# Patient Record
Sex: FEMALE | Race: WHITE | HISPANIC OR LATINO
[De-identification: names, ages, dates, MRNs, and addresses within clinical notes are randomized per-mention and may not be internally consistent; named-entity substitution may affect disease eponyms.]

---

## 2018-10-12 PROBLEM — Z00.00 ENCOUNTER FOR PREVENTIVE HEALTH EXAMINATION: Status: ACTIVE | Noted: 2018-10-12

## 2018-10-15 ENCOUNTER — APPOINTMENT (OUTPATIENT)
Dept: NEUROSURGERY | Facility: CLINIC | Age: 43
End: 2018-10-15

## 2018-10-16 ENCOUNTER — APPOINTMENT (OUTPATIENT)
Dept: NEUROSURGERY | Facility: CLINIC | Age: 43
End: 2018-10-16
Payer: COMMERCIAL

## 2018-10-16 VITALS
HEART RATE: 70 BPM | DIASTOLIC BLOOD PRESSURE: 65 MMHG | SYSTOLIC BLOOD PRESSURE: 135 MMHG | HEIGHT: 67 IN | BODY MASS INDEX: 27.62 KG/M2 | WEIGHT: 176 LBS

## 2018-10-16 PROCEDURE — 99204 OFFICE O/P NEW MOD 45 MIN: CPT

## 2021-09-24 ENCOUNTER — APPOINTMENT (OUTPATIENT)
Age: 46
End: 2021-09-24
Payer: COMMERCIAL

## 2021-09-24 VITALS
HEIGHT: 67 IN | DIASTOLIC BLOOD PRESSURE: 68 MMHG | TEMPERATURE: 98.1 F | WEIGHT: 164 LBS | BODY MASS INDEX: 25.74 KG/M2 | HEART RATE: 58 BPM | SYSTOLIC BLOOD PRESSURE: 118 MMHG

## 2021-09-24 DIAGNOSIS — Z78.9 OTHER SPECIFIED HEALTH STATUS: ICD-10-CM

## 2021-09-24 DIAGNOSIS — Z83.3 FAMILY HISTORY OF DIABETES MELLITUS: ICD-10-CM

## 2021-09-24 DIAGNOSIS — Z80.41 FAMILY HISTORY OF MALIGNANT NEOPLASM OF OVARY: ICD-10-CM

## 2021-09-24 PROCEDURE — 99213 OFFICE O/P EST LOW 20 MIN: CPT

## 2021-09-24 NOTE — HISTORY OF PRESENT ILLNESS
[de-identified] : \par Amanda is here for a follow-up evaluation although it has been about 3 years since we have seen each other.  She has right-sided cervical radiculopathy and a new MRI done at [facility] for my review.  She has persistent right-sided symptomatology that seems to be refractory to nonsurgical management.  Her last epidural steroid injection was about a year ago.  She is done trigger point pretty recently without much relief.\par \par trigger point 2 weeks   KEVEN   1 year\par \par PM  Candace Nolasco\par PCP  [place]

## 2021-09-24 NOTE — PHYSICAL EXAM
[Motor Handedness Right-Handed] : the patient is right hand dominant [Sensation Tactile Decrease] : light touch was intact [Sensation Pain / Temperature Decrease] : pain and temperature was intact [Sensation Vibration Decrease] : vibration was intact [Proprioception] : proprioception was intact [Romberg's Sign] : Romberg's sign was negtive [FreeTextEntry6] : She has weakness in  trace weakness in biceps and triceps on the right. [Full ROM] : full ROM

## 2021-09-24 NOTE — RESULTS/DATA
[FreeTextEntry1] : [facility] MRI shows disc osteophyte complex at C5-6 on the left but more notable is a disc herniation on the right at C6-7.

## 2021-09-24 NOTE — REASON FOR VISIT
[New Patient Visit] : a new patient visit [Referred By: _________] : Patient was referred by EULOGIO [FreeTextEntry1] : Past patient (2018)- MRI [facility], Attended Pain management with no relief

## 2021-09-24 NOTE — PLAN
[FreeTextEntry1] : DIAGNOSIS:  CERVICAL SPONDYLOSIS\par TREATMENT PLAN:  NON-SURGICAL   VS.   ACDF  C6-7\par \par This is a patient with cervical spondylosis and stenosis.  I have recommended nonsurgical management at this time.  This consists of physical therapy and/or manual medicine in conjunction to medical therapy and other conservative methods.  These include the consideration of trigger point injections and or the utilization of modalities such as TENS where applicable.  The next tier would be the referral to a pain management specialist (anesthesia or physiatry) for the consideration of spinal injections.  This includes the options of epidural steroids, facet injections as well as other novel techniques that may provide pain relief.  Although there is indeed evidence of disk degeneration on imaging, discectomy or spinal fusion for the sole purposes of treating neck pain in the absence of a compressive lesion or neurological issue is associated with poor outcomes.   \par \par If all  nonsurgical methods fail, and/or the patient develops neurologic issues then, I have recommended cervical decompression and fusion as a treatment option.  This entails removing the disk, osteophytes and the ventral uncovertebral joints along with the underlying hypertrophied/ossified posterior longitudinal ligamentum.  This will allow for a widening of the spinal canal and the neuroforamen at the effected levels.  In doing so this will likely result in added instability to the spine at this level requiring the need for instrumented stabilization and fusion.  This implies the use of anterior plate fixation and interbody prosthetics to provide strength and anatomical alignment to the operated segments.  \par \par Risks and benefits of surgery were described to the patient in detail which include but not excluding those otherwise not mentioned:  coma paralysis, death, stroke, spinal fluid leak, nerve injury, weakness, infection, hardware malfunction/failure/mal-positioning requiring re-operation, vascular injury, nonunion/pseudoarthrosis, adjacent segment degeneration, dysphonia/dysphagia and persistent pain.\par \par I have reviewed the images in detail with the patient today in my office and have answered all questions regarding this condition to the best of my ability to the patient’s satisfaction.\par

## 2021-10-08 ENCOUNTER — TRANSCRIPTION ENCOUNTER (OUTPATIENT)
Age: 46
End: 2021-10-08

## 2021-11-05 ENCOUNTER — NON-APPOINTMENT (OUTPATIENT)
Age: 46
End: 2021-11-05

## 2021-11-12 ENCOUNTER — OUTPATIENT (OUTPATIENT)
Dept: OUTPATIENT SERVICES | Facility: HOSPITAL | Age: 46
LOS: 1 days | End: 2021-11-12
Payer: COMMERCIAL

## 2021-11-12 PROCEDURE — 93010 ELECTROCARDIOGRAM REPORT: CPT

## 2021-11-19 ENCOUNTER — APPOINTMENT (OUTPATIENT)
Dept: DISASTER EMERGENCY | Facility: CLINIC | Age: 46
End: 2021-11-19

## 2021-11-19 DIAGNOSIS — Z01.818 ENCOUNTER FOR OTHER PREPROCEDURAL EXAMINATION: ICD-10-CM

## 2021-11-20 LAB — SARS-COV-2 N GENE NPH QL NAA+PROBE: NOT DETECTED

## 2021-11-22 ENCOUNTER — OUTPATIENT (OUTPATIENT)
Dept: OUTPATIENT SERVICES | Facility: HOSPITAL | Age: 46
LOS: 1 days | End: 2021-11-22

## 2021-11-22 ENCOUNTER — APPOINTMENT (OUTPATIENT)
Dept: NEUROSURGERY | Facility: HOSPITAL | Age: 46
End: 2021-11-22
Payer: COMMERCIAL

## 2021-11-22 PROCEDURE — 22853 INSJ BIOMECHANICAL DEVICE: CPT | Mod: AS

## 2021-11-22 PROCEDURE — 22853 INSJ BIOMECHANICAL DEVICE: CPT

## 2021-11-22 PROCEDURE — 22551 ARTHRD ANT NTRBDY CERVICAL: CPT

## 2021-11-22 PROCEDURE — 22551 ARTHRD ANT NTRBDY CERVICAL: CPT | Mod: AS

## 2021-11-23 ENCOUNTER — OUTPATIENT (OUTPATIENT)
Dept: OUTPATIENT SERVICES | Facility: HOSPITAL | Age: 46
LOS: 1 days | End: 2021-11-23

## 2021-11-30 ENCOUNTER — APPOINTMENT (OUTPATIENT)
Dept: NEUROSURGERY | Facility: CLINIC | Age: 46
End: 2021-11-30
Payer: COMMERCIAL

## 2021-11-30 VITALS
HEIGHT: 67 IN | TEMPERATURE: 98.2 F | DIASTOLIC BLOOD PRESSURE: 75 MMHG | WEIGHT: 164 LBS | BODY MASS INDEX: 25.74 KG/M2 | HEART RATE: 68 BPM | SYSTOLIC BLOOD PRESSURE: 125 MMHG

## 2021-11-30 PROCEDURE — 99024 POSTOP FOLLOW-UP VISIT: CPT

## 2021-11-30 RX ORDER — FLUCONAZOLE 100 MG/1
100 TABLET ORAL
Qty: 2 | Refills: 0 | Status: ACTIVE | COMMUNITY
Start: 2021-10-01

## 2021-11-30 RX ORDER — METHOCARBAMOL 750 MG/1
750 TABLET, FILM COATED ORAL
Qty: 90 | Refills: 0 | Status: ACTIVE | COMMUNITY
Start: 2021-11-23

## 2021-11-30 RX ORDER — TRAMADOL HYDROCHLORIDE 50 MG/1
50 TABLET, COATED ORAL
Qty: 60 | Refills: 0 | Status: ACTIVE | COMMUNITY
Start: 2021-11-23

## 2021-11-30 RX ORDER — ONDANSETRON 4 MG/1
4 TABLET ORAL
Qty: 10 | Refills: 0 | Status: ACTIVE | COMMUNITY
Start: 2021-11-23

## 2021-11-30 NOTE — HISTORY OF PRESENT ILLNESS
[FreeTextEntry1] : This is a 46-year-old female follows up in regards to your recent C6-C7 anterior cervical discectomy and fusion on November 22, 2021.  Initially the patient reports significant improvement in right-sided arm pain over the past few days that she has been off her medication she has had a slow return of her pain.  Pain is moderate and improves with Motrin.  Denies any new numbness tingling weakness or bowel bladder dysfunction

## 2021-11-30 NOTE — ASSESSMENT
[FreeTextEntry1] : In summary this 46-year-old female status post C6-C7 anterior cervical discectomy and fusion on November 22, 2021 with improving presurgical symptoms.  She can continue taking anti-inflammatories as prescribed.  She will continue take muscle relaxers as prescribed.  We will have her start physical therapy next week.  She will follow-up in 4 to 6 weeks for repeat evaluation and x-rays of the cervical spine under AP and lateral views\par \par

## 2022-01-04 ENCOUNTER — APPOINTMENT (OUTPATIENT)
Dept: NEUROSURGERY | Facility: CLINIC | Age: 47
End: 2022-01-04
Payer: COMMERCIAL

## 2022-01-04 VITALS — BODY MASS INDEX: 25.74 KG/M2 | HEIGHT: 67 IN | WEIGHT: 164 LBS

## 2022-01-04 PROCEDURE — 99024 POSTOP FOLLOW-UP VISIT: CPT

## 2022-01-04 RX ORDER — ONABOTULINUMTOXINA 100 [USP'U]/1
100 INJECTION, POWDER, LYOPHILIZED, FOR SOLUTION INTRADERMAL; INTRAMUSCULAR
Qty: 2 | Refills: 0 | Status: ACTIVE | COMMUNITY
Start: 2021-12-27

## 2022-02-22 ENCOUNTER — APPOINTMENT (OUTPATIENT)
Dept: NEUROSURGERY | Facility: CLINIC | Age: 47
End: 2022-02-22

## 2022-02-28 ENCOUNTER — RX RENEWAL (OUTPATIENT)
Age: 47
End: 2022-02-28

## 2022-02-28 RX ORDER — IBUPROFEN 800 MG/1
800 TABLET, FILM COATED ORAL 3 TIMES DAILY
Qty: 90 | Refills: 2 | Status: ACTIVE | COMMUNITY
Start: 2021-11-30 | End: 1900-01-01

## 2022-03-03 ENCOUNTER — APPOINTMENT (OUTPATIENT)
Dept: NEUROSURGERY | Facility: CLINIC | Age: 47
End: 2022-03-03
Payer: COMMERCIAL

## 2022-03-03 VITALS
HEIGHT: 67 IN | DIASTOLIC BLOOD PRESSURE: 70 MMHG | TEMPERATURE: 97.3 F | BODY MASS INDEX: 25.74 KG/M2 | WEIGHT: 164 LBS | SYSTOLIC BLOOD PRESSURE: 119 MMHG

## 2022-03-03 PROCEDURE — 99212 OFFICE O/P EST SF 10 MIN: CPT

## 2022-03-03 NOTE — ASSESSMENT
[FreeTextEntry1] : This is a 46-year-old female follows up in regards to your recent C6-C7 anterior cervical discectomy and fusion on November 22, 2021.  patient happy with results of surgery\par follow up for final xrays in 4-6 months

## 2022-03-03 NOTE — HISTORY OF PRESENT ILLNESS
[FreeTextEntry1] : This is a 46-year-old female follows up in regards to your recent C6-C7 anterior cervical discectomy and fusion on November 22, 2021. resolved presurgical symptoms\par \par no new nt, weakness, balance issues, or bladder issues\par

## 2022-06-13 ENCOUNTER — APPOINTMENT (OUTPATIENT)
Dept: NEUROSURGERY | Facility: CLINIC | Age: 47
End: 2022-06-13
Payer: COMMERCIAL

## 2022-06-13 VITALS
HEIGHT: 67 IN | HEART RATE: 66 BPM | WEIGHT: 168 LBS | SYSTOLIC BLOOD PRESSURE: 117 MMHG | BODY MASS INDEX: 26.37 KG/M2 | DIASTOLIC BLOOD PRESSURE: 81 MMHG | TEMPERATURE: 97.1 F

## 2022-06-13 PROCEDURE — 99213 OFFICE O/P EST LOW 20 MIN: CPT

## 2022-06-13 NOTE — ASSESSMENT
[FreeTextEntry1] : In summary this 47-year-old female follows up today in regards to ACDF C6-C7 on 11/22/2021.  She had excellent resolution of presurgical right arm symptoms and is now having new complaints of right upper trap pain.  This time the patient is requesting an MRI of the cervical spine which I believe is reasonable due to her severe and consistent pain in the meantime I would like her to try physical therapy and/or massage therapy and/or acupuncture.  She also will consider trigger point injections after MRI.  I will follow-up with her and Dr. Kaba once her MRI is completed

## 2022-06-13 NOTE — HISTORY OF PRESENT ILLNESS
[FreeTextEntry1] : This is a 46-year-old female follows up in regards to your recent C6-C7 anterior cervical discectomy and fusion on November 22, 2021. resolved presurgical symptoms\par \par no new nt, weakness, balance issues, or bladder issues\par \par She states since her surgery she has pain that radiates from her right side of her neck into her right trap and into her right shoulder.  She feels that this is worse with certain movements and is only relieved with rest

## 2022-07-14 LAB — SARS-COV-2 N GENE NPH QL NAA+PROBE: NOT DETECTED

## 2022-07-15 ENCOUNTER — APPOINTMENT (OUTPATIENT)
Dept: MRI IMAGING | Facility: CLINIC | Age: 47
End: 2022-07-15

## 2022-07-15 ENCOUNTER — OUTPATIENT (OUTPATIENT)
Dept: OUTPATIENT SERVICES | Facility: HOSPITAL | Age: 47
LOS: 1 days | End: 2022-07-15

## 2022-07-15 DIAGNOSIS — Z98.1 ARTHRODESIS STATUS: ICD-10-CM

## 2022-07-15 PROCEDURE — 72141 MRI NECK SPINE W/O DYE: CPT | Mod: 26

## 2022-07-28 ENCOUNTER — APPOINTMENT (OUTPATIENT)
Dept: NEUROSURGERY | Facility: CLINIC | Age: 47
End: 2022-07-28

## 2022-07-28 VITALS — BODY MASS INDEX: 26.37 KG/M2 | HEIGHT: 67 IN | WEIGHT: 168 LBS | TEMPERATURE: 98.4 F

## 2022-07-28 DIAGNOSIS — Z98.1 ARTHRODESIS STATUS: ICD-10-CM

## 2022-07-28 DIAGNOSIS — M50.10 CERVICAL DISC DISORDER WITH RADICULOPATHY, UNSPECIFIED CERVICAL REGION: ICD-10-CM

## 2022-07-28 PROCEDURE — 99213 OFFICE O/P EST LOW 20 MIN: CPT

## 2022-07-28 RX ORDER — PREGABALIN 75 MG/1
75 CAPSULE ORAL
Qty: 28 | Refills: 0 | Status: ACTIVE | COMMUNITY
Start: 2022-07-28 | End: 1900-01-01

## 2022-07-28 RX ORDER — CELECOXIB 200 MG/1
200 CAPSULE ORAL DAILY
Qty: 30 | Refills: 1 | Status: ACTIVE | COMMUNITY
Start: 2022-07-28 | End: 1900-01-01

## 2022-07-28 NOTE — RESULTS/DATA
[FreeTextEntry1] : \par [facility] stream cervical MRI was reviewed and compared to that of a MRI done at [facility].  MRI at [facility] dated September 2021 shows a area of spondylosis at C6-7 with disc osteophytes bilaterally right greater than left with a disc herniation  on the right.  The follow-up care stream image shows an excellent decompression at C6-7 with the interbody graft in position.  X-rays confirm this.  There is no evidence of recurrent disc herniation or persistent disc herniation at C6-7.  The foramina are completely patent.  The other levels do not show accelerated or adjacent segment degeneration of significance.

## 2022-07-28 NOTE — REASON FOR VISIT
[Follow-Up: _____] : a [unfilled] follow-up visit [FreeTextEntry1] : \par Amanda is in the office today for clinical and radiographic follow-up.  She had an anterior cervical decompression with fusion at C6-7 with a sentinel spine scale of seeing interbody implant.  She had a fairly large disc herniation seen at the [facility] imaging center at C6-7 paracentral to the right she has mild degenerative changes above but mostly on the left.  She has no left-sided symptoms and has some recurrent right-sided radicular complaints that are atypical including neck pain and headache.\par \par We ordered new MRI studies for review given her persistence of complaints and have gotten x-rays along the way during her follow-up.

## 2022-07-28 NOTE — ASSESSMENT
[FreeTextEntry1] : \par \par This is a patient with some persistent radicular complaints after anterior cervical decompression with fusion.  Her interbody graft is in position looks good on x-ray.  She is developing a union.  She has an MRI that was done recently compared to the old showing that the disc herniation is no longer present.  She does not have overt adjacent segment degeneration as well.  At this point I recommended pain management reevaluation for trigger points and some local and focal therapies.  We have also discussed the idea of getting a nerve conduction study with EMG however she is less inclined to go ahead with this.  Started her on Celebrex and given her prescription for Lyrica for medical management along the way.

## 2023-06-26 ENCOUNTER — OFFICE (OUTPATIENT)
Dept: URBAN - METROPOLITAN AREA CLINIC 113 | Facility: CLINIC | Age: 48
Setting detail: OPHTHALMOLOGY
End: 2023-06-26
Payer: COMMERCIAL

## 2023-06-26 DIAGNOSIS — H52.7: ICD-10-CM

## 2023-06-26 DIAGNOSIS — H10.45: ICD-10-CM

## 2023-06-26 PROCEDURE — 92014 COMPRE OPH EXAM EST PT 1/>: CPT | Performed by: STUDENT IN AN ORGANIZED HEALTH CARE EDUCATION/TRAINING PROGRAM

## 2023-06-26 PROCEDURE — 92015 DETERMINE REFRACTIVE STATE: CPT | Performed by: STUDENT IN AN ORGANIZED HEALTH CARE EDUCATION/TRAINING PROGRAM

## 2023-06-26 ASSESSMENT — REFRACTION_MANIFEST
OD_SPHERE: -0.25
OS_AXIS: 000
OD_ADD: +1.00
OD_SPHERE: -0.25
OD_AXIS: 058
OD_AXIS: 000
OS_AXIS: 088
OS_CYLINDER: 0.00
OD_CYLINDER: -0.25
OD_VA1: 20/15
OS_SPHERE: PLANO
OS_VA1: 20/20
OD_VA1: 20/20
OD_CYLINDER: 0.00
OS_VA1: 20/15
OS_ADD: +1.00
OS_CYLINDER: -0.50
OS_SPHERE: -0.25

## 2023-06-26 ASSESSMENT — VISUAL ACUITY
OD_BCVA: 20/20
OS_BCVA: 20/20

## 2023-06-26 ASSESSMENT — REFRACTION_AUTOREFRACTION
OD_CYLINDER: -0.25
OS_AXIS: 088
OD_SPHERE: -0.25
OD_AXIS: 058
OS_CYLINDER: -0.50
OS_SPHERE: 0.00

## 2023-06-26 ASSESSMENT — TONOMETRY
OS_IOP_MMHG: 11
OD_IOP_MMHG: 10

## 2023-06-26 ASSESSMENT — AXIALLENGTH_DERIVED
OD_AL: 24.2615
OD_AL: 24.2615
OS_AL: 24.1619
OD_AL: 24.2101
OS_AL: 24.1619

## 2023-06-26 ASSESSMENT — SPHEQUIV_DERIVED
OS_SPHEQUIV: -0.25
OD_SPHEQUIV: -0.25
OD_SPHEQUIV: -0.375
OS_SPHEQUIV: -0.25
OD_SPHEQUIV: -0.375

## 2023-06-26 ASSESSMENT — KERATOMETRY
OD_K2POWER_DIOPTERS: 42.50
OD_K1POWER_DIOPTERS: 41.75
OD_AXISANGLE_DEGREES: 081
OS_K1POWER_DIOPTERS: 42.00
OS_AXISANGLE_DEGREES: 088
OS_K2POWER_DIOPTERS: 42.50

## 2023-06-26 ASSESSMENT — CONFRONTATIONAL VISUAL FIELD TEST (CVF)
OD_FINDINGS: FULL
OS_FINDINGS: FULL

## 2024-12-15 ENCOUNTER — OFFICE (OUTPATIENT)
Dept: URBAN - METROPOLITAN AREA CLINIC 1 | Facility: CLINIC | Age: 49
Setting detail: OPHTHALMOLOGY
End: 2024-12-15
Payer: COMMERCIAL

## 2024-12-15 DIAGNOSIS — H43.391: ICD-10-CM

## 2024-12-15 DIAGNOSIS — H25.13: ICD-10-CM

## 2024-12-15 DIAGNOSIS — H52.4: ICD-10-CM

## 2024-12-15 PROCEDURE — 92014 COMPRE OPH EXAM EST PT 1/>: CPT

## 2024-12-15 PROCEDURE — 92015 DETERMINE REFRACTIVE STATE: CPT

## 2024-12-15 ASSESSMENT — CONFRONTATIONAL VISUAL FIELD TEST (CVF)
OS_FINDINGS: FULL
OD_FINDINGS: FULL

## 2024-12-16 ASSESSMENT — TONOMETRY
OD_IOP_MMHG: 16
OS_IOP_MMHG: 13

## 2024-12-17 ASSESSMENT — REFRACTION_CURRENTRX
OD_VPRISM_DIRECTION: SV
OS_CYLINDER: -0.50
OD_SPHERE: +0.75
OS_VPRISM_DIRECTION: SV
OD_CYLINDER: -0.25
OS_SPHERE: +1.00
OD_OVR_VA: 20/
OS_AXIS: 094
OS_OVR_VA: 20/
OD_AXIS: 064

## 2024-12-17 ASSESSMENT — REFRACTION_MANIFEST
OD_CYLINDER: -0.25
OD_SPHERE: -0.50
OS_AXIS: 088
OD_AXIS: 058
OS_ADD: +1.75
OS_CYLINDER: -0.50
OD_VA1: 20/20
OS_SPHERE: -0.50
OS_VA1: 20/20-2
OD_ADD: +1.00
OD_VA1: 20/15
OS_SPHERE: PLANO
OS_CYLINDER: -0.50
OS_VA1: 20/15
OD_AXIS: 050
OS_AXIS: 095
OS_ADD: +1.00
OD_CYLINDER: -0.25
OD_SPHERE: -0.25
OD_ADD: +1.75

## 2024-12-17 ASSESSMENT — KERATOMETRY
OD_K2POWER_DIOPTERS: 42.50
OD_AXISANGLE_DEGREES: 085
OS_AXISANGLE_DEGREES: 086
OS_K2POWER_DIOPTERS: 42.50
OD_K1POWER_DIOPTERS: 41.50
OS_K1POWER_DIOPTERS: 42.00

## 2024-12-17 ASSESSMENT — REFRACTION_AUTOREFRACTION
OS_CYLINDER: -0.50
OD_SPHERE: -0.50
OD_CYLINDER: -0.25
OD_AXIS: 051
OS_SPHERE: -0.50
OS_AXIS: 095

## 2024-12-17 ASSESSMENT — VISUAL ACUITY
OS_BCVA: 20/20
OD_BCVA: 20/25

## 2025-01-06 ENCOUNTER — OFFICE (OUTPATIENT)
Dept: URBAN - METROPOLITAN AREA CLINIC 1 | Facility: CLINIC | Age: 50
Setting detail: OPHTHALMOLOGY
End: 2025-01-06
Payer: COMMERCIAL

## 2025-01-06 DIAGNOSIS — H25.13: ICD-10-CM

## 2025-01-06 PROBLEM — H43.391 VITREOUS FLOATERS; RIGHT EYE: Status: ACTIVE | Noted: 2024-12-15

## 2025-01-06 PROCEDURE — 99213 OFFICE O/P EST LOW 20 MIN: CPT | Performed by: OPHTHALMOLOGY

## 2025-01-06 ASSESSMENT — REFRACTION_CURRENTRX
OS_VPRISM_DIRECTION: SV
OD_VPRISM_DIRECTION: SV
OS_AXIS: 094
OD_OVR_VA: 20/
OS_OVR_VA: 20/
OS_SPHERE: +1.00
OD_AXIS: 064
OD_CYLINDER: -0.25
OD_SPHERE: +0.75
OS_CYLINDER: -0.50

## 2025-01-06 ASSESSMENT — REFRACTION_MANIFEST
OS_AXIS: 088
OS_SPHERE: PLANO
OS_CYLINDER: -0.50
OD_AXIS: 050
OD_CYLINDER: -0.25
OS_SPHERE: -0.50
OD_ADD: +1.75
OS_ADD: +1.75
OD_VA1: 20/20
OD_CYLINDER: -0.25
OD_ADD: +1.00
OS_VA1: 20/20-2
OS_ADD: +1.00
OS_CYLINDER: -0.50
OS_AXIS: 095
OD_SPHERE: -0.50
OD_AXIS: 058
OD_SPHERE: -0.25
OD_VA1: 20/15
OS_VA1: 20/15

## 2025-01-06 ASSESSMENT — REFRACTION_AUTOREFRACTION
OS_CYLINDER: -0.75
OS_AXIS: 90
OD_SPHERE: -0.50
OD_CYLINDER: -0.25
OS_SPHERE: -0.25
OD_AXIS: 085

## 2025-01-06 ASSESSMENT — TONOMETRY
OD_IOP_MMHG: 10
OS_IOP_MMHG: 15

## 2025-01-06 ASSESSMENT — CONFRONTATIONAL VISUAL FIELD TEST (CVF)
OD_FINDINGS: FULL
OS_FINDINGS: FULL

## 2025-01-06 ASSESSMENT — KERATOMETRY
OS_K1POWER_DIOPTERS: 42.25
OD_K2POWER_DIOPTERS: 42.75
OD_K1POWER_DIOPTERS: 42.00
OS_AXISANGLE_DEGREES: 095
OS_K2POWER_DIOPTERS: 42.75
OD_AXISANGLE_DEGREES: 085

## 2025-01-06 ASSESSMENT — VISUAL ACUITY
OS_BCVA: 20/20
OD_BCVA: 20/30+2